# Patient Record
Sex: FEMALE
[De-identification: names, ages, dates, MRNs, and addresses within clinical notes are randomized per-mention and may not be internally consistent; named-entity substitution may affect disease eponyms.]

---

## 2017-01-17 ENCOUNTER — RESULT REVIEW (OUTPATIENT)
Age: 37
End: 2017-01-17

## 2017-01-18 ENCOUNTER — APPOINTMENT (OUTPATIENT)
Dept: OBGYN | Facility: CLINIC | Age: 37
End: 2017-01-18

## 2018-05-11 ENCOUNTER — RESULT REVIEW (OUTPATIENT)
Age: 38
End: 2018-05-11

## 2018-05-11 ENCOUNTER — APPOINTMENT (OUTPATIENT)
Dept: OBGYN | Facility: CLINIC | Age: 38
End: 2018-05-11
Payer: COMMERCIAL

## 2018-05-11 PROCEDURE — 99395 PREV VISIT EST AGE 18-39: CPT

## 2018-06-19 ENCOUNTER — APPOINTMENT (OUTPATIENT)
Dept: OBGYN | Facility: CLINIC | Age: 38
End: 2018-06-19
Payer: COMMERCIAL

## 2018-06-19 PROCEDURE — 58300 INSERT INTRAUTERINE DEVICE: CPT

## 2018-06-19 PROCEDURE — 58301 REMOVE INTRAUTERINE DEVICE: CPT

## 2019-10-02 ENCOUNTER — FORM ENCOUNTER (OUTPATIENT)
Age: 39
End: 2019-10-02

## 2019-10-03 ENCOUNTER — RESULT REVIEW (OUTPATIENT)
Age: 39
End: 2019-10-03

## 2019-10-03 ENCOUNTER — APPOINTMENT (OUTPATIENT)
Dept: OBGYN | Facility: CLINIC | Age: 39
End: 2019-10-03
Payer: COMMERCIAL

## 2019-10-03 PROCEDURE — 99395 PREV VISIT EST AGE 18-39: CPT

## 2019-10-04 ENCOUNTER — TRANSCRIPTION ENCOUNTER (OUTPATIENT)
Age: 39
End: 2019-10-04

## 2020-01-23 ENCOUNTER — FORM ENCOUNTER (OUTPATIENT)
Age: 40
End: 2020-01-23

## 2020-11-04 ENCOUNTER — FORM ENCOUNTER (OUTPATIENT)
Age: 40
End: 2020-11-04

## 2020-11-19 ENCOUNTER — FORM ENCOUNTER (OUTPATIENT)
Age: 40
End: 2020-11-19

## 2020-12-02 ENCOUNTER — FORM ENCOUNTER (OUTPATIENT)
Age: 40
End: 2020-12-02

## 2020-12-03 ENCOUNTER — APPOINTMENT (OUTPATIENT)
Dept: OBGYN | Facility: CLINIC | Age: 40
End: 2020-12-03
Payer: COMMERCIAL

## 2020-12-03 ENCOUNTER — RESULT REVIEW (OUTPATIENT)
Age: 40
End: 2020-12-03

## 2020-12-03 PROCEDURE — 99396 PREV VISIT EST AGE 40-64: CPT

## 2020-12-03 PROCEDURE — 36415 COLL VENOUS BLD VENIPUNCTURE: CPT

## 2020-12-03 PROCEDURE — 99072 ADDL SUPL MATRL&STAF TM PHE: CPT

## 2020-12-07 ENCOUNTER — FORM ENCOUNTER (OUTPATIENT)
Age: 40
End: 2020-12-07

## 2020-12-10 ENCOUNTER — FORM ENCOUNTER (OUTPATIENT)
Age: 40
End: 2020-12-10

## 2021-06-13 ENCOUNTER — FORM ENCOUNTER (OUTPATIENT)
Age: 41
End: 2021-06-13

## 2021-06-14 ENCOUNTER — NON-APPOINTMENT (OUTPATIENT)
Age: 41
End: 2021-06-14

## 2021-06-16 ENCOUNTER — FORM ENCOUNTER (OUTPATIENT)
Age: 41
End: 2021-06-16

## 2021-06-17 ENCOUNTER — FORM ENCOUNTER (OUTPATIENT)
Age: 41
End: 2021-06-17

## 2021-06-17 ENCOUNTER — APPOINTMENT (OUTPATIENT)
Dept: OBGYN | Facility: CLINIC | Age: 41
End: 2021-06-17
Payer: COMMERCIAL

## 2021-06-17 PROCEDURE — 99072 ADDL SUPL MATRL&STAF TM PHE: CPT

## 2021-06-17 PROCEDURE — 99213 OFFICE O/P EST LOW 20 MIN: CPT

## 2021-08-17 ENCOUNTER — FORM ENCOUNTER (OUTPATIENT)
Age: 41
End: 2021-08-17

## 2021-10-27 DIAGNOSIS — Z12.31 ENCOUNTER FOR SCREENING MAMMOGRAM FOR MALIGNANT NEOPLASM OF BREAST: ICD-10-CM

## 2021-10-27 DIAGNOSIS — R10.2 PELVIC AND PERINEAL PAIN: ICD-10-CM

## 2021-11-15 DIAGNOSIS — Z87.42 PERSONAL HISTORY OF OTHER DISEASES OF THE FEMALE GENITAL TRACT: ICD-10-CM

## 2021-11-15 DIAGNOSIS — Z98.890 OTHER SPECIFIED POSTPROCEDURAL STATES: ICD-10-CM

## 2021-11-15 DIAGNOSIS — Z92.89 PERSONAL HISTORY OF OTHER MEDICAL TREATMENT: ICD-10-CM

## 2021-11-15 DIAGNOSIS — Z97.5 PRESENCE OF (INTRAUTERINE) CONTRACEPTIVE DEVICE: ICD-10-CM

## 2021-11-15 DIAGNOSIS — Z83.3 FAMILY HISTORY OF DIABETES MELLITUS: ICD-10-CM

## 2021-11-15 RX ORDER — METFORMIN HYDROCHLORIDE 625 MG/1
TABLET ORAL
Refills: 0 | Status: ACTIVE | COMMUNITY

## 2021-11-16 PROBLEM — Z97.5 IUD (INTRAUTERINE DEVICE) IN PLACE: Status: ACTIVE | Noted: 2021-11-16

## 2021-11-16 PROBLEM — Z92.89 H/O MAMMOGRAM: Status: RESOLVED | Noted: 2021-11-16 | Resolved: 2021-11-16

## 2021-11-16 PROBLEM — Z98.890 HISTORY OF PAPANICOLAOU SMEAR: Status: RESOLVED | Noted: 2021-11-16 | Resolved: 2021-11-16

## 2021-11-16 PROBLEM — Z83.3 FAMILY HISTORY OF DIABETES MELLITUS: Status: ACTIVE | Noted: 2021-11-16

## 2021-11-16 PROBLEM — Z87.42 HISTORY OF VAGINITIS: Status: RESOLVED | Noted: 2021-11-16 | Resolved: 2021-11-16

## 2021-12-08 DIAGNOSIS — N83.209 UNSPECIFIED OVARIAN CYST, UNSPECIFIED SIDE: ICD-10-CM

## 2021-12-08 DIAGNOSIS — D25.9 LEIOMYOMA OF UTERUS, UNSPECIFIED: ICD-10-CM

## 2022-01-07 DIAGNOSIS — R92.2 INCONCLUSIVE MAMMOGRAM: ICD-10-CM

## 2022-01-12 DIAGNOSIS — N64.89 OTHER SPECIFIED DISORDERS OF BREAST: ICD-10-CM

## 2022-01-12 DIAGNOSIS — R59.0 LOCALIZED ENLARGED LYMPH NODES: ICD-10-CM

## 2022-01-12 DIAGNOSIS — R92.8 OTHER ABNORMAL AND INCONCLUSIVE FINDINGS ON DIAGNOSTIC IMAGING OF BREAST: ICD-10-CM

## 2022-02-07 ENCOUNTER — APPOINTMENT (OUTPATIENT)
Dept: OBGYN | Facility: CLINIC | Age: 42
End: 2022-02-07
Payer: COMMERCIAL

## 2022-02-07 VITALS
SYSTOLIC BLOOD PRESSURE: 110 MMHG | WEIGHT: 164 LBS | BODY MASS INDEX: 30.96 KG/M2 | HEIGHT: 61 IN | DIASTOLIC BLOOD PRESSURE: 80 MMHG

## 2022-02-07 DIAGNOSIS — Z12.39 ENCOUNTER FOR OTHER SCREENING FOR MALIGNANT NEOPLASM OF BREAST: ICD-10-CM

## 2022-02-07 PROCEDURE — 99396 PREV VISIT EST AGE 40-64: CPT

## 2022-02-07 NOTE — HISTORY OF PRESENT ILLNESS
[Patient reported PAP Smear was normal] : Patient reported PAP Smear was normal [FreeTextEntry1] : 42 y/o female  LMP 22.  She presents for annual gyn exam and offers no complaints. \par \par OB History: NSVDx2 ('99,'04) \par GYN History: Mirena IUD placed in 2018\par PMH: No significant past medical history. \par Surgical History: Shoulder \par \par  [Mammogramdate] : 1/22 [TextBox_19] : [facility] BIR[place] 0 (incomplete) [BreastSonogramDate] : 1/22 [TextBox_25] : [facility] [place] 2 [PapSmeardate] : 12/2020

## 2022-02-07 NOTE — PLAN
[FreeTextEntry1] : 40 y/o female  LMP 22, annual exam\par \par Annual\par -pap done today\par -rx left breast mammo/sono (repeat)\par -f/u PCP for annual and appropriate immunizations\par -rto 1 year. \par \par

## 2022-02-07 NOTE — END OF VISIT
[FreeTextEntry3] : I, Yesika Wilson, acted as a scribe on behalf for Dr. Darlene Gould on 02/07/2022.\par \par All medical entries made by the scribe were at my, Dr. Darlene Gould, direction and personally dictated by me on 02/07/2022. I have reviewed the chart and agree that the record accurately reflects my personal performance of the history, physical exam, assessment, and plan. I have personally directed, reviewed, and agreed with the chart.

## 2022-02-09 LAB — HPV HIGH+LOW RISK DNA PNL CVX: NOT DETECTED

## 2022-02-13 LAB — CYTOLOGY CVX/VAG DOC THIN PREP: NORMAL

## 2022-11-10 ENCOUNTER — APPOINTMENT (OUTPATIENT)
Dept: OBGYN | Facility: CLINIC | Age: 42
End: 2022-11-10

## 2022-11-10 VITALS
WEIGHT: 164 LBS | DIASTOLIC BLOOD PRESSURE: 74 MMHG | SYSTOLIC BLOOD PRESSURE: 118 MMHG | HEIGHT: 61 IN | BODY MASS INDEX: 30.96 KG/M2

## 2022-11-10 PROCEDURE — 99213 OFFICE O/P EST LOW 20 MIN: CPT

## 2022-11-10 NOTE — END OF VISIT
[FreeTextEntry3] : I, Dolly Ibrahim, acted as a scribe on behalf of Dr. Darlene Gould on 11/10/2022. \par \par All medical entries made by the scribe where at my, Dr. Darlene Gould, direction and personally dictated by me on 11/10/2022. I have reviewed the chart and agree that the record accurately reflects my personal performance of the history, physical exam, assessment, and plan. I have also personally directed, reviewed and agreed with the chart.\par

## 2022-11-10 NOTE — HISTORY OF PRESENT ILLNESS
[FreeTextEntry1] : 43 yo  LMP 10/31/22, presents for follow up regarding abdominal pain in LLQ. This pain occurs intermittently.  She does have h/o fibroids. \par \par OB History: NSVDx2 ('99,'04) \par GYN History: Mirena IUD placed in 2018\par PMH: No significant past medical history. \par Surgical History: Shoulder \par \par PELVIC SONO 22: \par FINDINGS: \par Uterus: 9.5 cm x 4.2 cm x 5.8 cm. Volume = 121.2 cm3. 6 mm mildly complex nabothian cyst Fibroid uterus, with reference fibroid(s) described below:\par \par -Fibroid 1: - 2.2 cm x 2.7 cm x 2.1 cm. Anterior right uterine body intramural,, increased size, previously up to 1.5 cm \par -Fibroid 2: -2.6 cm x 1.6 cm x 2.8 cm. Anterior left uterine body subserosal / pedunculated, unchanged\par -Fibroid 3: -2.1 cm x 1.6 cm x 1.8 cm. Posterior left uterine body subserosal, increased size, previously up to 1.5 cm\par \par ENDOMETRIUM: 0.3 cm. Normal thickness. No focal finding\par \par RIGHT OVARY: 2.4 cm x 1.8 cm x 2.5 cm. Volume = 5.7cm3. 2.2 cm dominant follicle/cyst\par \par LEFT OVARY: 3.0 cm x 1.5 cm x 2.2 cm. Volume = 5.2 cm3. Normal\par \par ADNEXA / OTHER: No additional findings.\par \par FREE FLUID: No free fluid.

## 2022-11-10 NOTE — PLAN
[FreeTextEntry1] : 43 yo  LMP 10/31/22, LLQ pain. \par \par LLQ pain\par -reviewed recent pelvic sono, wnl.\par -exam normal\par -pt to continue to monitor pain

## 2023-03-07 PROBLEM — Z12.39 SCREENING BREAST EXAMINATION: Status: ACTIVE | Noted: 2023-03-07

## 2023-04-21 ENCOUNTER — APPOINTMENT (OUTPATIENT)
Dept: OBGYN | Facility: CLINIC | Age: 43
End: 2023-04-21
Payer: COMMERCIAL

## 2023-04-21 VITALS
WEIGHT: 153 LBS | SYSTOLIC BLOOD PRESSURE: 130 MMHG | DIASTOLIC BLOOD PRESSURE: 84 MMHG | HEIGHT: 61 IN | BODY MASS INDEX: 28.89 KG/M2

## 2023-04-21 DIAGNOSIS — Z01.419 ENCOUNTER FOR GYNECOLOGICAL EXAMINATION (GENERAL) (ROUTINE) W/OUT ABNORMAL FINDINGS: ICD-10-CM

## 2023-04-21 PROCEDURE — 99396 PREV VISIT EST AGE 40-64: CPT

## 2023-04-21 NOTE — HISTORY OF PRESENT ILLNESS
[Patient reported mammogram was normal] : Patient reported mammogram was normal [Patient reported PAP Smear was normal] : Patient reported PAP Smear was normal [FreeTextEntry1] : 43 yo female pt  present for an annual wellness visit. She reports having night sweats lately. The pt's  got a vasectomy. The pt is well and has no complaints.\par \par OB History: NSVDx2 ('99,'04) \par GYN History: Mirena IUD placed in 2018\par PMH: No significant past medical history. \par Surgical History: Shoulder \par  [Mammogramdate] : 01/22 [TextBox_19] : birads 2  [PapSmeardate] : 02/22

## 2023-04-24 LAB — HPV HIGH+LOW RISK DNA PNL CVX: NOT DETECTED

## 2023-04-27 LAB — CYTOLOGY CVX/VAG DOC THIN PREP: NORMAL

## 2023-06-15 DIAGNOSIS — Z12.31 ENCOUNTER FOR SCREENING MAMMOGRAM FOR MALIGNANT NEOPLASM OF BREAST: ICD-10-CM

## 2024-07-17 ENCOUNTER — APPOINTMENT (OUTPATIENT)
Dept: OBGYN | Facility: CLINIC | Age: 44
End: 2024-07-17

## 2024-11-08 ENCOUNTER — APPOINTMENT (OUTPATIENT)
Dept: OBGYN | Facility: CLINIC | Age: 44
End: 2024-11-08
Payer: COMMERCIAL

## 2024-11-08 VITALS — SYSTOLIC BLOOD PRESSURE: 126 MMHG | WEIGHT: 148 LBS | BODY MASS INDEX: 27.96 KG/M2 | DIASTOLIC BLOOD PRESSURE: 76 MMHG

## 2024-11-08 PROCEDURE — 99213 OFFICE O/P EST LOW 20 MIN: CPT | Mod: 25

## 2024-11-08 PROCEDURE — 99459 PELVIC EXAMINATION: CPT

## 2024-11-08 PROCEDURE — 99396 PREV VISIT EST AGE 40-64: CPT

## 2024-11-11 LAB — HPV HIGH+LOW RISK DNA PNL CVX: NOT DETECTED

## 2024-11-13 LAB — CYTOLOGY CVX/VAG DOC THIN PREP: NORMAL

## 2024-11-14 ENCOUNTER — APPOINTMENT (OUTPATIENT)
Dept: OBGYN | Facility: CLINIC | Age: 44
End: 2024-11-14

## 2025-02-09 ENCOUNTER — EMERGENCY (EMERGENCY)
Facility: HOSPITAL | Age: 45
LOS: 1 days | Discharge: HOME | End: 2025-02-09
Attending: STUDENT IN AN ORGANIZED HEALTH CARE EDUCATION/TRAINING PROGRAM | Admitting: STUDENT IN AN ORGANIZED HEALTH CARE EDUCATION/TRAINING PROGRAM
Payer: COMMERCIAL

## 2025-02-09 VITALS
RESPIRATION RATE: 18 BRPM | HEART RATE: 82 BPM | SYSTOLIC BLOOD PRESSURE: 132 MMHG | DIASTOLIC BLOOD PRESSURE: 85 MMHG | OXYGEN SATURATION: 100 % | TEMPERATURE: 99 F

## 2025-02-09 VITALS
OXYGEN SATURATION: 99 % | DIASTOLIC BLOOD PRESSURE: 84 MMHG | HEART RATE: 79 BPM | SYSTOLIC BLOOD PRESSURE: 136 MMHG | HEIGHT: 61 IN | WEIGHT: 139.99 LBS | TEMPERATURE: 98 F | RESPIRATION RATE: 18 BRPM

## 2025-02-09 LAB
ANION GAP SERPL CALC-SCNC: 6 MMOL/L — SIGNIFICANT CHANGE UP (ref 5–17)
BASOPHILS # BLD AUTO: 0.02 K/UL — SIGNIFICANT CHANGE UP (ref 0–0.2)
BASOPHILS NFR BLD AUTO: 0.4 % — SIGNIFICANT CHANGE UP (ref 0–2)
BUN SERPL-MCNC: 14 MG/DL — SIGNIFICANT CHANGE UP (ref 7–23)
CALCIUM SERPL-MCNC: 8.9 MG/DL — SIGNIFICANT CHANGE UP (ref 8.5–10.1)
CHLORIDE SERPL-SCNC: 107 MMOL/L — SIGNIFICANT CHANGE UP (ref 96–108)
CO2 SERPL-SCNC: 26 MMOL/L — SIGNIFICANT CHANGE UP (ref 22–31)
CREAT SERPL-MCNC: 0.75 MG/DL — SIGNIFICANT CHANGE UP (ref 0.5–1.3)
EGFR: 100 ML/MIN/1.73M2 — SIGNIFICANT CHANGE UP
EOSINOPHIL # BLD AUTO: 0.05 K/UL — SIGNIFICANT CHANGE UP (ref 0–0.5)
EOSINOPHIL NFR BLD AUTO: 1.1 % — SIGNIFICANT CHANGE UP (ref 0–6)
GLUCOSE SERPL-MCNC: 95 MG/DL — SIGNIFICANT CHANGE UP (ref 70–99)
HCG SERPL-ACNC: 2 MIU/ML — SIGNIFICANT CHANGE UP
HCT VFR BLD CALC: 38.5 % — SIGNIFICANT CHANGE UP (ref 34.5–45)
HGB BLD-MCNC: 13 G/DL — SIGNIFICANT CHANGE UP (ref 11.5–15.5)
IMM GRANULOCYTES NFR BLD AUTO: 0.2 % — SIGNIFICANT CHANGE UP (ref 0–0.9)
LYMPHOCYTES # BLD AUTO: 1.16 K/UL — SIGNIFICANT CHANGE UP (ref 1–3.3)
LYMPHOCYTES # BLD AUTO: 24.5 % — SIGNIFICANT CHANGE UP (ref 13–44)
MAGNESIUM SERPL-MCNC: 2.1 MG/DL — SIGNIFICANT CHANGE UP (ref 1.6–2.6)
MCHC RBC-ENTMCNC: 30.4 PG — SIGNIFICANT CHANGE UP (ref 27–34)
MCHC RBC-ENTMCNC: 33.8 G/DL — SIGNIFICANT CHANGE UP (ref 32–36)
MCV RBC AUTO: 90.2 FL — SIGNIFICANT CHANGE UP (ref 80–100)
MONOCYTES # BLD AUTO: 0.39 K/UL — SIGNIFICANT CHANGE UP (ref 0–0.9)
MONOCYTES NFR BLD AUTO: 8.2 % — SIGNIFICANT CHANGE UP (ref 2–14)
NEUTROPHILS # BLD AUTO: 3.11 K/UL — SIGNIFICANT CHANGE UP (ref 1.8–7.4)
NEUTROPHILS NFR BLD AUTO: 65.6 % — SIGNIFICANT CHANGE UP (ref 43–77)
NRBC # BLD: 0 /100 WBCS — SIGNIFICANT CHANGE UP (ref 0–0)
NRBC BLD-RTO: 0 /100 WBCS — SIGNIFICANT CHANGE UP (ref 0–0)
PLATELET # BLD AUTO: 282 K/UL — SIGNIFICANT CHANGE UP (ref 150–400)
POTASSIUM SERPL-MCNC: 4.1 MMOL/L — SIGNIFICANT CHANGE UP (ref 3.5–5.3)
POTASSIUM SERPL-SCNC: 4.1 MMOL/L — SIGNIFICANT CHANGE UP (ref 3.5–5.3)
RBC # BLD: 4.27 M/UL — SIGNIFICANT CHANGE UP (ref 3.8–5.2)
RBC # FLD: 11.8 % — SIGNIFICANT CHANGE UP (ref 10.3–14.5)
SODIUM SERPL-SCNC: 139 MMOL/L — SIGNIFICANT CHANGE UP (ref 135–145)
WBC # BLD: 4.74 K/UL — SIGNIFICANT CHANGE UP (ref 3.8–10.5)
WBC # FLD AUTO: 4.74 K/UL — SIGNIFICANT CHANGE UP (ref 3.8–10.5)

## 2025-02-09 PROCEDURE — 83735 ASSAY OF MAGNESIUM: CPT

## 2025-02-09 PROCEDURE — 80048 BASIC METABOLIC PNL TOTAL CA: CPT

## 2025-02-09 PROCEDURE — 99284 EMERGENCY DEPT VISIT MOD MDM: CPT

## 2025-02-09 PROCEDURE — 93005 ELECTROCARDIOGRAM TRACING: CPT

## 2025-02-09 PROCEDURE — 85025 COMPLETE CBC W/AUTO DIFF WBC: CPT

## 2025-02-09 PROCEDURE — 36415 COLL VENOUS BLD VENIPUNCTURE: CPT

## 2025-02-09 PROCEDURE — 93010 ELECTROCARDIOGRAM REPORT: CPT

## 2025-02-09 PROCEDURE — 84702 CHORIONIC GONADOTROPIN TEST: CPT

## 2025-02-09 NOTE — ED PROVIDER NOTE - OBJECTIVE STATEMENT
44F with PMH of HTN who presents with abnormal outpatient lab work. patient had routine blood work done by PCP and her potassium was 6.9. she has neve rhad this before, her only recent symptom is being slightly fatigued however no known kidney disease and denies chest pain or sob.

## 2025-02-09 NOTE — ED ADULT NURSE NOTE - IS THE PATIENT ABLE TO BE SCREENED?
- Start Carafate daily 1 hour prior to meals  - Continue on Prilosec daily  - Recommend Following up with GI specialist next week as scheduled  - Labs today  - If any worsening pain, fevers/chills, vomiting please go in to be evaluated.   - Recommend taking Miralax daily until you see GI     FROY Herron CNP    
Yes

## 2025-02-09 NOTE — ED ADULT NURSE NOTE - NSFALLASSESSNEED_ED_ALL_ED
----- Message from Eriberto Mendez no

## 2025-02-09 NOTE — ED PROVIDER NOTE - CLINICAL SUMMARY MEDICAL DECISION MAKING FREE TEXT BOX
44F with PMH of HTN who presents with abnormal outpatient lab work. patient had routine blood work done by PCP and her potassium was 6.9. she has neve rhad this before, her only recent symptom is being slightly fatigued however no known kidney disease and denies chest pain or sob.    patient is very well appearing without symptoms, suspect lab work was hemolyzed resulting in a falsely elevated potassium. will repeat blood work here and obtain ECG in case of real hyperkalemia 44F with PMH of HTN who presents with abnormal outpatient lab work. patient had routine blood work done by PCP and her potassium was 6.9. she has neve rhad this before, her only recent symptom is being slightly fatigued however no known kidney disease and denies chest pain or sob.    patient is very well appearing without symptoms, suspect lab work was hemolyzed resulting in a falsely elevated potassium. will repeat blood work here and obtain ECG in case of real hyperkalemia    Patient was re-evaluated at this time and she remains well appearing. her potassium here is completely normal and normal ECG, k of 4.1. likely outpatient the lab result was hemolyzed causing a falsely elevated result. The Patient will be discharged home at this time. Their lab results were explained with the patient and they were instructed to go over them with their PCP. All questions were answered at this time.     Patient was told to follow up with their PCP within the next 2 days. they were told to return to the ED if they have any other concerns    patient will be discharged home at this time, they are in agreement with the plan

## 2025-02-09 NOTE — ED ADULT TRIAGE NOTE - CHIEF COMPLAINT QUOTE
pt states that she was told by her PCP to come in for a K+ of 6.9. pt denies CP, palpitations and SOB at this time.

## 2025-02-09 NOTE — ED PROVIDER NOTE - PHYSICAL EXAMINATION
Gen: Awake, Alert, NAD, well appearing  Head:  NC/AT  Eyes:  PERRL, EOMI, Conjunctiva pink, no scleral icterus  ENT:  no exudates, no erythema, uvula midline, TMs clear bilaterally, moist mucus membranes  Neck: supple, nontender, no meningismus, no JVD, trachea midline  Cardiac/CV:  S1 S2, RRR, no murmurs  Respiratory/Pulm:  CTAB, good air movement, normal resp effort, no wheezes/stridor/retractions/rales/rhonchi  Gastrointestinal/Abdomen:  Soft, nontender, nondistended, no rebound/guarding  Ext:  warm, well perfused, moving all extremities spontaneously, no cyanosis, no erythema, no edema, distal pulses intact  Skin: intact, no rash, no petechiae, no ecchymosis  Neuro:  AAOx3, sensation intact, motor 5/5 x 4 extremities, clear speech, normal gait

## 2025-02-09 NOTE — ED ADULT NURSE NOTE - NSFALLUNIVINTERV_ED_ALL_ED
Bed/Stretcher in lowest position, wheels locked, appropriate side rails in place/Call bell, personal items and telephone in reach/Instruct patient to call for assistance before getting out of bed/chair/stretcher/Non-slip footwear applied when patient is off stretcher/[place] to call system/Physically safe environment - no spills, clutter or unnecessary equipment/Purposeful proactive rounding/Room/bathroom lighting operational, light cord in reach

## 2025-02-09 NOTE — ED PROVIDER NOTE - NSFOLLOWUPINSTRUCTIONS_ED_ALL_ED_FT
Please return to the Emergency Department immediately for any other concerns, chest pain, shortness of breath and if you have any other problems or concerns. Please follow up with your Primary Care Physician within the next 2 days.    Please take any prescription medications prescribed as instructed    Please follow up with PCP within the next 2 days as well

## 2025-02-09 NOTE — ED PROVIDER NOTE - PATIENT PORTAL LINK FT
You can access the FollowMyHealth Patient Portal offered by [facility] by registering at the following website: [email or web address]. By joining [facility] FollowMyHealth portal, you will also be able to view your health information using other applications (apps) compatible with our system.

## 2025-02-09 NOTE — ED ADULT NURSE NOTE - OBJECTIVE STATEMENT
patient presents to ED with abnormal potassium levels.  patient was at her primary doctor for her annual visit yesterday and had blood work done.  she received a phone call this morning telling her to go to the ED for an abnormal K level.  patient is having no cardiac symptoms at all.  EKG completed and patient placed on monitor.,